# Patient Record
Sex: FEMALE | NOT HISPANIC OR LATINO | Employment: OTHER | URBAN - NONMETROPOLITAN AREA
[De-identification: names, ages, dates, MRNs, and addresses within clinical notes are randomized per-mention and may not be internally consistent; named-entity substitution may affect disease eponyms.]

---

## 2017-01-01 ENCOUNTER — APPOINTMENT (OUTPATIENT)
Dept: RADIOLOGY | Facility: HOSPITAL | Age: 82
End: 2017-01-01
Payer: MEDICARE

## 2017-01-01 PROCEDURE — 71020 HB CHEST X-RAY 2VW FRONTAL&LATL: CPT

## 2017-01-11 NOTE — CMS CERTIFICATION NOTE
Certification Statement -  Dior Tapia  :10/26/1928  MRN: [id number]    [address] UNIT Room / Bed: [place] [address]/[facility] 948-18 Encounter: [id number]    I certify that Casa Cuellar requires further inpatient hospitalization beyond 20 days due to *Deconditioning and breathlessness as a result of pulmonary embolism**      Shirin Mcgee DO     Date: 2017  Time: 8:55 AM

## 2017-01-11 NOTE — H&P
H&P Exam - Haig Noss 80 y o  female MRN: [id number]    Unit/Bed#: -08 Encounter: [id number]    Assessment:  *Ambulatory dysfunction/gait abnormality  History of stroke with hemiparesis  Restless leg syndrome  Diabetes type 2  Diabetic neuropathy**    Plan:  *Restorative care for post stroke abnormalities  Diabetic control  Risk factor modifications regarding atherosclerosis  Physical occupational therapy as tolerated**    History of Present Illness   **Patient is a 22-year-old woman resident of the skilled nursing facility following a stroke  The patient is nonverbal and cannot participate in history  Appropriate staff concerns have been addressed  Treatment plan and medications have been reviewed  *    Review of SystemsCannot be obtained due to nonverbal status of patient  Historical Information   No past medical history on file  No past surgical history on file  Social History   History   Alcohol use Not on file     History   Drug Use Not on file     History   Smoking Status    Not on file   Smokeless Tobacco    Not on file     Family History: Cannot be obtained  Meds/Allergies   all medications and allergies reviewed  Allergies   Allergen Reactions    Amoxicillin     Cefaclor     Cephalosporins     Propoxyphene        Objective   First Vitals:        Current Vitals:        No intake or output data in the 24 hours ending 01/11/17 0858    Invasive Devices          No matching active lines, drains, or airways          Physical ExamVital signs are acceptable upon review  Patient appears well-nourished and well-developed and well-hydrated  She is in no acute distress  Pupils are round and reactive  Extraocular movements intact  No icterus  Moist mucous membranes  Chest is clear with no inspiratory rales respiratory wheeze  Heart regular  Abdomen obese and soft  There is no clubbing cyanosis or edema of extremities  Neurologic: The patient makes eye contact but does not verbalize      Lab Results: *Pertinent labs reviewed**  Imaging: *Reports reviewed**  EKG, Pathology, and Other Studies: *Reports r**    Code Status: Level 3 - DNAR and DNI  Advance Directive and Living Will:      Power of :    POLST:      Counseling / Coordination of Care: Total floor / unit time spent today 35 minutes

## 2017-01-15 NOTE — PROGRESS NOTES
This is an interval progress note on this patient per the nursing unit protocol  The patient is lying in bed  She appears comfortable  She is essentially nonverbal but does make eye contact  Her treatment care plan and chart were reviewed  Medications were reviewed  The staff has no immediate concerns regarding her care  Vital signs are acceptable upon review  Her head is atraumatic  Pupils are reactive  No scleral icterus is noted  Oral mucosa is moist  Neck is supple  No JVD or bruit  Chest is clear  No wheezes or rales  Heart is regular with no murmur  Abdomen is obese soft and nontender  There is no clubbing or cyanosis of her extremities  There is no edema  She does make eye contact but does not verbalize  I cannot elicit any gross neurologic deficits  Assessment and plan:  The patient will continue to have physical therapy as tolerated status post her stroke  Hyperlipidemia and hypertension are being managed with appropriate medications  Diabetes type 2  Patient is stable on her current insolent regimen  Other chronic medical conditions are being appropriately treated per the medication record  There will be no significant changes at this time

## 2017-01-24 PROBLEM — Z00.00 ROUTINE ADULT HEALTH MAINTENANCE: Status: ACTIVE | Noted: 2017-01-24

## 2017-04-03 PROBLEM — E11.9 TYPE 2 DIABETES MELLITUS WITHOUT COMPLICATION (HCC): Status: ACTIVE | Noted: 2017-04-03
